# Patient Record
(demographics unavailable — no encounter records)

---

## 2024-10-18 NOTE — HISTORY OF PRESENT ILLNESS
[FreeTextEntry1] : 46F that presents to clinic for removal of multiple chronic cysts. She states she has three cysts the most notable on her anterior midline chest and posterior neck. She states he chest cyst has been present for about 6 years and it fluctuates in size and becomes red and inflamed. She tries to squeeze it frequently and when she does she is able to express some pus. The most episode of inflammation was in January of 2024. The neck cyst has been present for >20 years and it hasnt been red or inflamed for quite some time. She also notes a small bump on her left malar prominence that she is worried may be a cyst as well.   Social hx: works at [employer] on [address], smoker 6 cigarettes a day >20 years SrxHx: no surgery Fhx: Multiple sclerosis (mom), Ataxia (sister) PMHx: Anxiety

## 2024-10-18 NOTE — PHYSICAL EXAM
[de-identified] : Comfortable appearing [de-identified] : left lateral periocular milia <1 mm, no erythema [de-identified] : breathing well on room air [de-identified] : <0rmv5od firm nodule without any central punctum or head in midline of chest (decollete) [de-identified] : posterior neck 1x1cm lesion palpable on left side of spinous processes, feels like superficial inclusion cyst with overly central punctum; no erythema or drainage  Left temple small closed comedone, no head, superficial [id number].

## 2024-10-18 NOTE — ASSESSMENT
[FreeTextEntry1] : 46F w/ hx of anxiety that would like her chronic cysts excised from her cheek, chest, and posterior neck.   Recommend elective excision of left cheek, chest and posterior neck cysts under local anesthesia (wide awake office procedure) after smoking cessation.  -I discussed the benefits, risks and alternatives. The risks include but not limited to bleeding, infection, poor wound healing and scarring, possible keloid, cyst recurrence, and need for re-operation. Location of scar and expected post-surgical outcomes were discussed. The patient understands the risks and would like to proceed with the office surgery after smoking cessation -Counseled smoking cessation -Reviewed increased risk of wound healing complications and infection with smoking & nicotine/tobacco use -Recommend 4-6 weeks nicotine free before proceeding with surgery - she is currently applying topical creams to her cysts with good results, encouraged she continue -Will schedule after smoking cessation

## 2025-03-31 NOTE — ASSESSMENT
[FreeTextEntry1] : 46F w/ hx of anxiety that would like her chronic cysts excised from her cheek, chest, and posterior neck.   s/p excision of chest and posterior neck cysts under local anesthesia (wide awake office procedure) after smoking cessation (11 days)  -pt tolerated the procedure -Tylenol PRN pain -no water submersion (swimming pools, hot tub, open bodies of water) -c/w smoking cessation -Reviewed increased risk of wound healing complications and infection with smoking & nicotine/tobacco use - wear sports bra for chest scar offloading and healing -follow up in 2 weeks for suture removal (chest), path review, scar mgmt

## 2025-03-31 NOTE — PHYSICAL EXAM
[de-identified] : Comfortable appearing [de-identified] : left lateral periocular milia <1 mm, no erythema [de-identified] : breathing well on room air [de-identified] : <[id number] firm nodule without any central punctum or head in midline of chest (decollete) [de-identified] : posterior neck 1x1cm lesion palpable on left side of spinous processes, feels like superficial inclusion cyst with overly central punctum; no erythema or drainage  Left temple small closed comedone, no head, superficial [id number].

## 2025-03-31 NOTE — HISTORY OF PRESENT ILLNESS
[FreeTextEntry1] : 46F that presents to clinic for removal of multiple chronic cysts. She states she has three cysts the most notable on her anterior midline chest and posterior neck. She states he chest cyst has been present for about 6 years and it fluctuates in size and becomes red and inflamed. She tries to squeeze it frequently and when she does she is able to express some pus. The most episode of inflammation was in January of 2024. The neck cyst has been present for >20 years and it hasnt been red or inflamed for quite some time. She also notes a small bump on her left malar prominence that she is worried may be a cyst as well.   Social hx: works at [employer] on [address], smoker 6 cigarettes a day >20 years SrxHx: no surgery Fhx: Multiple sclerosis (mom), Ataxia (sister) PMHx: Anxiety  Interval hx (3/31/25): Here for surgical procedure: chest and posterior neck cysts.  She reports that she has been picking at all of her cysts.  the left cheek cyst is no longer present and does not wish to under procedure in this area.  The other remaining areas or smaller but still have palpable cysts.  Denies fevers, chills, redness or purulent drainage

## 2025-03-31 NOTE — PROCEDURE
[Nl] : None [FreeTextEntry1] : posterior neck and left chest cyst [FreeTextEntry2] : posterior neck and left chest cyst excision and complex repair [FreeTextEntry6] : Benefits, risks and alternatives of the procedure were discussed. The risks include but not limited to bleeding, infection, poor wound healing and scarring, possible keloid, and need for re-operation. Location of scar and expected post-surgical outcomes were discussed. The patient understands the risks and would like to proceed with the office surgery.  Informed consent was obtained.   The skin lesion was marked and infiltrated with local anesthesia to effect.  The excision site was prepared and draped in sterile fashion. The skin lesion was excised with the indicated margins in the usual fashion and sent to pathology for review.  Surgical wounds were made hemostatic and repaired as follows:  Site: posterior neck  Skin lesion width (with margins):  1 mm skin x 5 mm cyst Closure complexity and length: 1 cm complex (4-0 Monocryl DD and 5-0 monocryl running subcuticular)  Site: left chest Skin lesion width (with margins): 1 mm skin x 3 mm cyst Closure complexity and length: 1 cm complex (4-0 monocryl DD and 5-0 running subcuticular) [FreeTextEntry7] : posterior neck and left chest cyst

## 2025-04-14 NOTE — HISTORY OF PRESENT ILLNESS
[FreeTextEntry1] : 46F that presents to clinic for removal of multiple chronic cysts. She states she has three cysts the most notable on her anterior midline chest and posterior neck. She states he chest cyst has been present for about 6 years and it fluctuates in size and becomes red and inflamed. She tries to squeeze it frequently and when she does she is able to express some pus. The most episode of inflammation was in January of 2024. The neck cyst has been present for >20 years and it hasnt been red or inflamed for quite some time. She also notes a small bump on her left malar prominence that she is worried may be a cyst as well.   Social hx: works at [employer] on [address], smoker 6 cigarettes a day >20 years SrxHx: no surgery Fhx: Multiple sclerosis (mom), Ataxia (sister) PMHx: Anxiety  Interval hx (3/31/25): Here for surgical procedure: chest and posterior neck cysts.  She reports that she has been picking at all of her cysts.  the left cheek cyst is no longer present and does not wish to under procedure in this area.  The other remaining areas or smaller but still have palpable cysts.  Denies fevers, chills, redness or purulent drainage  Interval Hx (4/14/25): Pt here POD#14 s/p chest and posterior neck cyst excisions in office.  Doing very well, concerned that they sites feel "raised" but otherwise no concerns.  Denies f/c/bleeding.

## 2025-04-14 NOTE — ASSESSMENT
[FreeTextEntry1] : 46F w/ hx of anxiety that would like her chronic cysts excised from her cheek, chest, and posterior neck.   POD#14 s/p excision of chest and posterior neck cysts under local anesthesia (wide awake office procedure)  - suture tail cut; daily aquaphor x 1 week then ok to start scar creams as discussed  - gentle scar massage in shower  - pathology reviewed, benign  - avoid heavy lifting x 1 more week  - cont sports bra for 1 more week.  - reassurance provided, explained sutures take time to dissolve - all questions answered to apparent satisfaction  - follow up x 6 weeks with Dr. Trevizo for scar check.

## 2025-04-14 NOTE — PHYSICAL EXAM
[de-identified] : Comfortable appearing, very pleasant female [de-identified] : left lateral periocular milia <1 mm, no erythema [de-identified] : posterior neck incision healing very well, no erythema/open wounds/drainage.   [de-identified] : breathing well on room air [de-identified] : midline chest incision healing very well with suture tail in place, no erythema/open wounds/drainage.   [de-identified] :   Left temple small closed comedone, no head, superficial [id number].

## 2025-04-14 NOTE — DATA REVIEWED
[FreeTextEntry1] : Patient:     TORREY FUNEZ   Accession:                             [id number]  Collected Date/Time:                   3/31/2025 14:49 EDT Received Date/Time:                    4/1/2025 08:58 EDT  Surgical Pathology Report - Auth (Verified)  Specimen(s) Submitted 1  Left chest cyst 2  Posterior neck cyst  Final Diagnosis 1. Left chest cyst: - Fragment of skin with focal surface cyst, could represent the benign cyst wall, see note   Note: Multiple deeper level sections examined.  2. Posterior neck cyst: - Fragment of skin with epidermal inclusion cyst, see note  Note: Multiple deeper level sections examined. Verified by: Catina Morillo M.D. (Electronic Signature) Reported on: 04/04/25 17:05 EDT, [place] [address] Phone: [phone]   Fax: [phone] _________________________________________________________________

## 2025-04-23 NOTE — DISCUSSION/SUMMARY
[FreeTextEntry1] : Ms. Fuchs is a 47yo woman being seen for lower back pain with weakness in right toe dorsiflexion likely due too acute L5/S1 radiculopathy. 1. MRI lumbar spine w/o APURVA 2. Medrol dose pack 3. PT following improvement in pain

## 2025-04-23 NOTE — PHYSICAL EXAM
[FreeTextEntry1] : MS: Awake, alert, oriented to person, place, situation and time.  Follows commands.   Language: Speech is clear, fluent. No dysarthria.   CNs 2 - 12 intact. EOMI no nystagmus, no diplopia. No facial asymmetry b/l. Tongue midline, normal movements, no atrophy.  Motor: Normal muscle bulk & tone. No noticeable tremor or seizure. No pronator drift. Muscle strength of b/l UE and b/l LE 5/5. EXCEPT right toe dorsiflexion 4-/5  Sensation: Intact to LT, PP, Temp, Vib b/l throughout  Cortical: No extinction  Coordination: Intact rapid-alternating movements. No dysmetria to FTN.   DTR: 3+ in biceps, brachioradialis and triceps; 1+ in patellar and ankle; plantars are down b/l  Gait: No postural instability. Normal stance and tandem gait.

## 2025-04-23 NOTE — HISTORY OF PRESENT ILLNESS
[FreeTextEntry1] : Since last visit she has had intermittent episodes of back pain.  Recently after bathing her dog she developed severe pain in her lower back which improved after 24-48 hours and then after carrying out heavy item from her home developed severe lower back pain which has been present for the last 4 days.  She has pain when sitting, pain which getting up from sitting, pain in the morning when waking from sleep. No spasms or muscle twitches   From initial visit on 12/13/2021: Ms. Fuchs is a 42yo woman with PMHX of anxiety being seen for evaluation of 2 months of constant back pain.  The pain is located in the lower back and in the neck between the scapula.  Pain is constant and only fluctuates in severity from 1-2/10 to 7-9/10 in intensity.  Pain does not radiate into any extremities.  She has been using Tyelenol arthritis, NSAIDs and Salon-Pas patches.  They give temporary relief but then symptoms will worsen again.  Standing or sitting does not aggravate the pain.  Has not done any physical therapy and has been stretching but it does not seem to help much. She does have a history of back issues and had an Emergency department visit in [id number] for the back pain requiring pain medications.  Over the years she gets back pain but usually it will last only for 1-2 days.

## 2025-05-22 NOTE — HISTORY OF PRESENT ILLNESS
[Y] : Patient is sexually active [N] : Patient denies prior pregnancies [FreeTextEntry1] : 5/20/25 [Currently Active] : currently active [No] : No

## 2025-05-22 NOTE — PROCEDURE
[Cervical Pap Smear] : cervical Pap smear [Liquid Base] : liquid base [GC & Chlamydia via Pap] : GC & Chlamydia via Pap [Tolerated Well] : the patient tolerated the procedure well [No Complications] : there were no complications [Abnormal Uterine Bleeding] : abnormal uterine bleeding [Transvaginal Ultrasound] : transvaginal ultrasound [Anteverted] : anteverted [L: ___ cm] : L: [unfilled] cm [W: ___cm] : W: [unfilled] cm [H: ___ cm] : H: [unfilled] cm [FreeTextEntry9] : irregular menses every 2-3 weeks [FreeTextEntry5] : EMS thin [FreeTextEntry7] : 0.57cc  [FreeTextEntry8] : 3.32cc 2 sub cm follicles  7x7mm and 7x6mm

## 2025-05-22 NOTE — PHYSICAL EXAM
[Chaperoned Physical Exam] : A chaperone was present in the examining room during all aspects of the physical examination. [MA] : MA [FreeTextEntry2] : JOE Recio [Appropriately responsive] : appropriately responsive [Alert] : alert [No Acute Distress] : no acute distress [Soft] : soft [Non-tender] : non-tender [Non-distended] : non-distended [Oriented x3] : oriented x3 [Examination Of The Breasts] : a normal appearance [No Discharge] : no discharge [No Masses] : no breast masses were palpable [Labia Majora] : normal [Labia Minora] : normal [Moderate] : There was moderate vaginal bleeding [Normal] : normal [Uterine Adnexae] : normal [FreeTextEntry1] : no enlarged LN noted on todays examination

## 2025-05-30 NOTE — HISTORY OF PRESENT ILLNESS
[FreeTextEntry1] : 46F that presents to clinic for removal of multiple chronic cysts. She states she has three cysts the most notable on her anterior midline chest and posterior neck. She states he chest cyst has been present for about 6 years and it fluctuates in size and becomes red and inflamed. She tries to squeeze it frequently and when she does she is able to express some pus. The most episode of inflammation was in January of 2024. The neck cyst has been present for >20 years and it hasnt been red or inflamed for quite some time. She also notes a small bump on her left malar prominence that she is worried may be a cyst as well.   Social hx: works at [employer] on [address], smoker 6 cigarettes a day >20 years SrxHx: no surgery Fhx: Multiple sclerosis (mom), Ataxia (sister) PMHx: Anxiety  Interval hx (3/31/25): Here for surgical procedure: chest and posterior neck cysts.  She reports that she has been picking at all of her cysts.  the left cheek cyst is no longer present and does not wish to under procedure in this area.  The other remaining areas or smaller but still have palpable cysts.  Denies fevers, chills, redness or purulent drainage  Interval Hx (4/14/25): Pt here POD#14 s/p chest and posterior neck cyst excisions in office.  Doing very well, concerned that they sites feel "raised" but otherwise no concerns.  Denies f/c/bleeding.    Interval hx (5/30/25) 2 months s/p chest and posterior neck cyst excisions in office.  Applying [employer] scar cream and nightime Mederma.  thinks the scar is still raised more in the front than in the posterior neck.

## 2025-05-30 NOTE — DATA REVIEWED
[FreeTextEntry1] : Patient:     TORREY FUNEZ   Accession:                             [id number]  Collected Date/Time:                   3/31/2025 14:49 EDT Received Date/Time:                    4/1/2025 08:58 EDT  Surgical Pathology Report - Auth (Verified)  Specimen(s) Submitted 1  Left chest cyst 2  Posterior neck cyst  Final Diagnosis 1. Left chest cyst: - Fragment of skin with focal surface cyst, could represent the benign cyst wall, see note   Note: Multiple deeper level sections examined.  2. Posterior neck cyst: - Fragment of skin with epidermal inclusion cyst, see note  Note: Multiple deeper level sections examined. Verified by: Catina Morillo M.D. (Electronic Signature) Reported on: 04/04/25 17:05 EDT, [place] [address] Phone: [phone]   Fax: [phone] _________________________________________________________________

## 2025-05-30 NOTE — PHYSICAL EXAM
[de-identified] : Comfortable appearing, very pleasant female [de-identified] : left lateral periocular milia <1 mm, no erythema [de-identified] : posterior neck incision healing very well with firm deep scar tissue [de-identified] : breathing well on room air [de-identified] : midline chest incision healing with firm underlying soft tissue ~ 8 mm [de-identified] :   Left temple small closed comedone, no head, superficial [id number].

## 2025-05-30 NOTE — ASSESSMENT
[FreeTextEntry1] : 46F w/ hx of anxiety that would like her chronic cysts excised from her cheek, chest, and posterior neck.   2 months 4 s/p excision of chest and posterior neck cysts under local anesthesia (wide awake office procedure)  - c/w gentle scar massage  - c/w all physical activity - start nightly silicone taping therapy x 2-4 months - if worsening scar size, follow up in 2 months, possible kenalog injection vs other tx - all questions answered to apparent satisfaction  - follow up 4 months